# Patient Record
Sex: FEMALE | Race: WHITE | NOT HISPANIC OR LATINO | URBAN - METROPOLITAN AREA
[De-identification: names, ages, dates, MRNs, and addresses within clinical notes are randomized per-mention and may not be internally consistent; named-entity substitution may affect disease eponyms.]

---

## 2024-01-08 ENCOUNTER — APPOINTMENT (RX ONLY)
Dept: URBAN - METROPOLITAN AREA CLINIC 81 | Facility: CLINIC | Age: 74
Setting detail: DERMATOLOGY
End: 2024-01-08

## 2024-01-08 DIAGNOSIS — D485 NEOPLASM OF UNCERTAIN BEHAVIOR OF SKIN: ICD-10-CM

## 2024-01-08 PROBLEM — D48.5 NEOPLASM OF UNCERTAIN BEHAVIOR OF SKIN: Status: ACTIVE | Noted: 2024-01-08

## 2024-01-08 PROCEDURE — ? ADDITIONAL NOTES

## 2024-01-08 PROCEDURE — ? BIOPSY BY SHAVE METHOD

## 2024-01-08 PROCEDURE — 11102 TANGNTL BX SKIN SINGLE LES: CPT

## 2024-01-08 ASSESSMENT — LOCATION SIMPLE DESCRIPTION DERM: LOCATION SIMPLE: RIGHT CHEEK

## 2024-01-08 ASSESSMENT — LOCATION ZONE DERM: LOCATION ZONE: FACE

## 2024-01-08 ASSESSMENT — LOCATION DETAILED DESCRIPTION DERM: LOCATION DETAILED: RIGHT INFERIOR CENTRAL MALAR CHEEK

## 2024-01-08 NOTE — PROCEDURE: ADDITIONAL NOTES
Additional Notes: Pt reports lesion since 4 y/o, pt also reports recent growth over the last several years
Render Risk Assessment In Note?: no
Detail Level: Simple

## 2025-03-11 ENCOUNTER — APPOINTMENT (OUTPATIENT)
Dept: URBAN - METROPOLITAN AREA CLINIC 81 | Facility: CLINIC | Age: 75
Setting detail: DERMATOLOGY
End: 2025-03-11

## 2025-03-11 DIAGNOSIS — Z41.9 ENCOUNTER FOR PROCEDURE FOR PURPOSES OTHER THAN REMEDYING HEALTH STATE, UNSPECIFIED: ICD-10-CM

## 2025-03-11 PROCEDURE — ? ADDITIONAL NOTES

## 2025-03-11 PROCEDURE — ? BOTOX

## 2025-03-11 NOTE — PROCEDURE: BOTOX
L Brow Units: 0
Price (Use Numbers Only, No Special Characters Or $): 135.22
Show Periorbital Units: Yes
Forehead Units: 58
Detail Level: Detailed
Show Ucl Units: No
Consent: Written consent obtained. Risks include but not limited to lid/brow ptosis, bruising, swelling, diplopia, temporary effect, incomplete chemical denervation.
Post-Care Instructions: Patient instructed to not lie down for 4 hours and limit physical activity for 24 hours. Patient instructed not to travel by airplane for 48 hours.
Lot #: [id number]
Dilution (U/0.1 Cc): 2.5
Reconstitution Date (Optional): 3/10/25
Incrementing Botox Units: By 0.5 Units
Expiration Date (Month Year): 2/2027

## 2025-03-11 NOTE — PROCEDURE: ADDITIONAL NOTES
Additional Notes: Botox consult \\n\\nGlabella 18 units \\nbunnies 8 units \\nForehead 12 units \\nCrows 20 units \\nTotal 58 UNITS \\n754\\nMOB PROCE 678.60\\n\\nFILLER \\nCheeks \\nRecmd 4 syringes \\n3400\\nMOB 2750\\n\\nCOOLPEEL \\n799 per treatment \\nRecmd 3 \\n Skin tightening \\nReduce pore size \\nTexture \\nTone \\nBrown spots \\nToning \\nWrinkles
Render Risk Assessment In Note?: no
Detail Level: Detailed